# Patient Record
Sex: MALE | Race: WHITE | NOT HISPANIC OR LATINO | Employment: FULL TIME | URBAN - NONMETROPOLITAN AREA
[De-identification: names, ages, dates, MRNs, and addresses within clinical notes are randomized per-mention and may not be internally consistent; named-entity substitution may affect disease eponyms.]

---

## 2017-05-01 ENCOUNTER — OFFICE VISIT (OUTPATIENT)
Dept: URGENT CARE | Facility: PHYSICIAN GROUP | Age: 57
End: 2017-05-01
Payer: COMMERCIAL

## 2017-05-01 VITALS
BODY MASS INDEX: 21.17 KG/M2 | RESPIRATION RATE: 16 BRPM | HEART RATE: 75 BPM | TEMPERATURE: 98.1 F | WEIGHT: 165 LBS | SYSTOLIC BLOOD PRESSURE: 100 MMHG | HEIGHT: 74 IN | OXYGEN SATURATION: 96 % | DIASTOLIC BLOOD PRESSURE: 60 MMHG

## 2017-05-01 DIAGNOSIS — R06.02 SOB (SHORTNESS OF BREATH): ICD-10-CM

## 2017-05-01 DIAGNOSIS — J06.9 URI WITH COUGH AND CONGESTION: ICD-10-CM

## 2017-05-01 PROCEDURE — 99203 OFFICE O/P NEW LOW 30 MIN: CPT | Performed by: PHYSICIAN ASSISTANT

## 2017-05-01 RX ORDER — METHYLPREDNISOLONE 4 MG/1
TABLET ORAL
Qty: 21 TAB | Refills: 0 | Status: SHIPPED | OUTPATIENT
Start: 2017-05-01 | End: 2017-10-03

## 2017-05-01 RX ORDER — ALBUTEROL SULFATE 90 UG/1
2 AEROSOL, METERED RESPIRATORY (INHALATION) EVERY 6 HOURS PRN
Qty: 8.5 G | Refills: 1 | Status: SHIPPED | OUTPATIENT
Start: 2017-05-01

## 2017-05-01 RX ORDER — DOXYCYCLINE HYCLATE 100 MG
100 TABLET ORAL 2 TIMES DAILY
Qty: 20 TAB | Refills: 0 | Status: SHIPPED | OUTPATIENT
Start: 2017-05-01 | End: 2017-10-03

## 2017-05-01 RX ORDER — CODEINE PHOSPHATE AND GUAIFENESIN 10; 100 MG/5ML; MG/5ML
5 SOLUTION ORAL NIGHTLY PRN
Qty: 120 ML | Refills: 0 | Status: SHIPPED | OUTPATIENT
Start: 2017-05-01 | End: 2017-10-03

## 2017-05-01 NOTE — Clinical Note
May 1, 2017         Patient: Michael Luu   YOB: 1960   Date of Visit: 5/1/2017           To Whom it May Concern:    Michael Luu was seen in my clinic on 5/1/2017. He may return to work on 5/2/17.    If you have any questions or concerns, please don't hesitate to call.        Sincerely,           Christie Almodovar PA-C  Electronically Signed

## 2017-05-01 NOTE — PROGRESS NOTES
Chief Complaint   Patient presents with   • Cough     x 1 month       HISTORY OF PRESENT ILLNESS: Patient is a 57 y.o. male who presents today for evaluation of a one-month history of cough. Patient reports green sputum production, nasal congestion, sore throat, shortness of breath. Patient denies any fevers. He has been unable sleep because of the cough and has had worsening fatigue at work from difficulty breathing. Patient started smoking when he was 5 years old. [place] from [place], [place]. Exertion makes his shortness of breath and cough worse. Over-the-counter medication has not been helping. He does not yet have a primary care provider. Patient has type 1 diabetes and states that his blood sugar has been running slightly higher than normal. Patient is about out of his insulin and is asking if he can have a refill until he can get into primary care.    There are no active problems to display for this patient.      Allergies:Review of patient's allergies indicates no known allergies.    Current Outpatient Prescriptions Ordered in [facility]   Medication Sig Dispense Refill   • doxycycline (VIBRAMYCIN) 100 MG Tab Take 1 Tab by mouth 2 times a day. 20 Tab 0   • guaifenesin-codeine (ROBITUSSIN AC) Solution oral solution Take 5 mL by mouth at bedtime as needed. 120 mL 0   • albuterol 108 (90 BASE) MCG/ACT Aero Soln inhalation aerosol Inhale 2 Puffs by mouth every 6 hours as needed for Shortness of Breath. 8.5 g 1   • MethylPREDNISolone (MEDROL DOSEPAK) 4 MG Tablet Therapy Pack Use as package directs 21 Tab 0   • insulin aspart (NOVOLOG) 100 UNIT/ML Solution Use in pump as directed 1 Vial 0     No current Epic-ordered facility-administered medications on file.       History reviewed. No pertinent past medical history.    Social History   Substance Use Topics   • Smoking status: Current Every Day Smoker -- 1.00 packs/day for 50 years   • Smokeless tobacco: Never Used   • Alcohol Use: No       No family status  "information on file.   History reviewed. No pertinent family history.    ROS:   Review of Systems   Constitutional: Negative for fever, chills, weight loss and malaise/fatigue.   HENT: Negative for ear pain, nosebleeds,   sore throat and neck pain.    Eyes: Negative for blurred vision.   Respiratory: Positive for cough, sputum production, shortness of breath.    Cardiovascular: Negative for chest pain, palpitations, orthopnea and leg swelling.   Gastrointestinal: Negative for heartburn, nausea, vomiting and abdominal pain.   Genitourinary: Negative for dysuria, urgency and frequency.       Exam:  Blood pressure 100/60, pulse 75, temperature 36.7 °C (98.1 °F), resp. rate 16, height 1.88 m (6' 2\"), weight 74.844 kg (165 lb), SpO2 96 %.  General: Normal appearing. No distress.  HEENT: Conjunctiva clear, lids without ptosis, ears normal shape and contour, canals are clear bilaterally, tympanic membranes are benign, nasal mucosa benign, oropharynx is without erythema, edema or exudates.  Pulmonary: Decreased breath sounds throughout.  Cardiovascular: Regular rate and rhythm without murmur.   Neurologic: Grossly nonfocal.  Lymph: No cervical lymphadenopathy noted.  Skin: No obvious lesions.  Psych: Normal mood. Alert and oriented x3. Judgment and insight is normal.    X-ray is unavailable at this time.    Assessment/Plan:  Use all medications as directed. Follow-up for worsening or persistent symptoms. Establish and follow up with a primary care provider.  1. URI with cough and congestion  doxycycline (VIBRAMYCIN) 100 MG Tab    guaifenesin-codeine (ROBITUSSIN AC) Solution oral solution   2. SOB (shortness of breath)  albuterol 108 (90 BASE) MCG/ACT Aero Soln inhalation aerosol    MethylPREDNISolone (MEDROL DOSEPAK) 4 MG Tablet Therapy Pack   3. IDDM (insulin dependent diabetes mellitus) (CMS-[facility])  insulin aspart (NOVOLOG) 100 UNIT/ML Solution         "

## 2017-05-30 NOTE — TELEPHONE ENCOUNTER
Was the patient seen in the last year in this department? No     Does patient have an active prescription for medications requested? Yes     Received Request Via: Patient     Patient came in and requesting Novolog 100 unit/ml to be used in the insulin pump. They need enough to get to his next appointment.

## 2017-06-01 ENCOUNTER — TELEPHONE (OUTPATIENT)
Dept: MEDICAL GROUP | Facility: PHYSICIAN GROUP | Age: 57
End: 2017-06-01

## 2017-06-01 NOTE — TELEPHONE ENCOUNTER
Per Ina, we can order additional vials of Insulin for patient as he is going on vacation and will EST CARE with Dr Hammond in July. Left VM for Michael he does not need to come for appointment Friday 6/2/17. Vials of Insulin will be at pharmacy ready for pick-up.

## 2017-06-02 NOTE — TELEPHONE ENCOUNTER
Rx for 3 additional vials printed, please fax. Per patient's wife he uses 4 vials at 10ml each per month, he is on sliding scale.

## 2017-07-03 ENCOUNTER — OFFICE VISIT (OUTPATIENT)
Dept: MEDICAL GROUP | Facility: PHYSICIAN GROUP | Age: 57
End: 2017-07-03
Payer: COMMERCIAL

## 2017-07-03 VITALS
WEIGHT: 155.8 LBS | BODY MASS INDEX: 19.99 KG/M2 | HEART RATE: 74 BPM | RESPIRATION RATE: 16 BRPM | TEMPERATURE: 98.4 F | OXYGEN SATURATION: 97 % | SYSTOLIC BLOOD PRESSURE: 118 MMHG | DIASTOLIC BLOOD PRESSURE: 66 MMHG | HEIGHT: 74 IN

## 2017-07-03 DIAGNOSIS — M20.42 ACQUIRED HAMMER TOES OF BOTH FEET: ICD-10-CM

## 2017-07-03 DIAGNOSIS — E10.42 TYPE 1 DIABETES MELLITUS WITH DIABETIC POLYNEUROPATHY (HCC): ICD-10-CM

## 2017-07-03 DIAGNOSIS — Z12.11 SCREENING FOR COLON CANCER: ICD-10-CM

## 2017-07-03 DIAGNOSIS — R63.4 WEIGHT LOSS: ICD-10-CM

## 2017-07-03 DIAGNOSIS — J06.9 VIRAL UPPER RESPIRATORY TRACT INFECTION: ICD-10-CM

## 2017-07-03 DIAGNOSIS — M20.41 ACQUIRED HAMMER TOES OF BOTH FEET: ICD-10-CM

## 2017-07-03 PROCEDURE — 99215 OFFICE O/P EST HI 40 MIN: CPT | Performed by: FAMILY MEDICINE

## 2017-07-03 ASSESSMENT — PATIENT HEALTH QUESTIONNAIRE - PHQ9: CLINICAL INTERPRETATION OF PHQ2 SCORE: 0

## 2017-07-03 NOTE — MR AVS SNAPSHOT
"        Michael Harrisond   7/3/2017 4:20 PM   Office Visit   MRN: [id number]    Department:  [facility]   Dept Phone:  [phone]    Description:  Male : 1960   Provider:  Romeo Hammond M.D.           Reason for Visit     Diabetes Type 1 on Pump    New Patient lab orders      Allergies as of 7/3/2017     No Known Allergies      You were diagnosed with     Acquired hammer toes of both feet   [1523522]       Type 1 diabetes mellitus with diabetic polyneuropathy (CMS-HCC)   [927039]       Weight loss   [470013]       Viral upper respiratory tract infection   [020681]       Screening for colon cancer   [203718]         Vital Signs     Blood Pressure Pulse Temperature Respirations Height Weight    118/66 mmHg 74 36.9 °C (98.4 °F) 16 1.88 m (6' 2.02\") 70.67 kg (155 lb 12.8 oz)    Body Mass Index Oxygen Saturation Smoking Status             19.99 kg/m2 97% Current Every Day Smoker         Basic Information     Date Of Birth Sex Race Ethnicity Preferred Language    1960 Male White Non- English      Your appointments     Oct 03, 2017  4:40 PM   Established Patient with Romeo Hammond M.D.   [facility]    [address] [place] [id number] [phone]           You will be receiving a confirmation call a few days before your appointment from our automated call confirmation system.              Problem List              ICD-10-CM Priority Class Noted - Resolved    Acquired hammer toes of both feet M20.41, M20.42   7/3/2017 - Present    Type 1 diabetes mellitus with diabetic polyneuropathy (CMS-HCC) E10.42   7/3/2017 - Present    Weight loss R63.4   7/3/2017 - Present    Viral upper respiratory tract infection J06.9, B97.89   7/3/2017 - Present      Health Maintenance        Date Due Completion Dates    IMM HEP B VACCINE (1 of 3 - Primary Series) 1960 ---    A1C SCREENING 1960 ---    DIABETES MONOFILAMENT / LE EXAM 1960 ---   " RETINAL SCREENING 3/20/1979 ---    FASTING LIPID PROFILE 3/20/1979 ---    URINE ACR / MICROALBUMIN 3/20/1979 ---    SERUM CREATININE 3/20/1979 ---    IMM DTaP/Tdap/Td Vaccine (1 - Tdap) 3/20/1980 ---    COLONOSCOPY 3/20/2011 ---    IMM INFLUENZA (1) 9/1/2017 ---            Current Immunizations     No immunizations on file.      Below and/or attached are the medications your provider expects you to take. Review all of your home medications and newly ordered medications with your provider and/or pharmacist. Follow medication instructions as directed by your provider and/or pharmacist. Please keep your medication list with you and share with your provider. Update the information when medications are discontinued, doses are changed, or new medications (including over-the-counter products) are added; and carry medication information at all times in the event of emergency situations     Allergies:  No Known Allergies          Medications  Valid as of: July 03, 2017 -  5:40 PM    Generic Name Brand Name Tablet Size Instructions for use    Albuterol Sulfate (Aero Soln) albuterol 108 (90 BASE) MCG/ACT Inhale 2 Puffs by mouth every 6 hours as needed for Shortness of Breath.        Doxycycline Hyclate (Tab) VIBRAMYCIN 100 MG Take 1 Tab by mouth 2 times a day.        Guaifenesin-Codeine (Solution) ROBITUSSIN -10 mg/5mL Take 5 mL by mouth at bedtime as needed.        Insulin Aspart (Solution) NOVOLOG 100 UNIT/ML Use in pump as directed        MethylPREDNISolone (Tablet Therapy Pack) MEDROL DOSEPAK 4 MG Use as package directs        .                 Medicines prescribed today were sent to:     [facility] - [place], [place] - [address] [place]    [address] [place] [address]    Phone: [phone] Fax: [phone]    Open 24 Hours?: No      Medication refill instructions:       If your prescription bottle indicates you have medication refills left, it is not  necessary to call your provider’s office. Please contact your pharmacy and they will refill your medication.    If your prescription bottle indicates you do not have any refills left, you may request refills at any time through one of the following ways: The online eCareer system (except Urgent Care), by calling your provider’s office, or by asking your pharmacy to contact your provider’s office with a refill request. Medication refills are processed only during regular business hours and may not be available until the next business day. Your provider may request additional information or to have a follow-up visit with you prior to refilling your medication.   *Please Note: Medication refills are assigned a new Rx number when refilled electronically. Your pharmacy may indicate that no refills were authorized even though a new prescription for the same medication is available at the pharmacy. Please request the medicine by name with the pharmacy before contacting your provider for a refill.        Your To Do List     Future Labs/Procedures Complete By Expires    COMP METABOLIC PANEL  As directed 7/3/2018    HEMOGLOBIN A1C  As directed 7/3/2018    LIPID PROFILE  As directed 7/3/2018    MICROALBUMIN CREAT RATIO URINE (LAB COLLECT)  As directed 7/3/2018    OCCULT BLOOD FECES IMMUNOASSAY  As directed 7/3/2018      Referral     A referral request has been sent to our patient care coordination department. Please allow 3-5 business days for us to process this request and contact you either by phone or mail. If you do not hear from us by the 5th business day, please call us at [phone].           eCareer Access Code: [email or web address]  Expires: 8/2/2017  5:38 PM    Your email address is not on file at [facility].  Email Addresses are required for you to sign up for eCareer, please contact [phone] to verify your personal information and to provide your email address prior to attempting to register for  [employer].    Michael Luu  [address]  [address]  [place] [id number]    MyChart  A secure, online tool to manage your health information     [employer]’s [employer]® is a secure, online tool that connects you to your personalized health information from the privacy of your home -- day or night - making it very easy for you to manage your healthcare. Once the activation process is completed, you can even access your medical information using the [facility] paddy, which is available for free in the Apple Paddy store or Google Play store.     To learn more about [facility], visit [email or web address]    There are two levels of access available (as shown below):   My Chart Features  [facility] Primary Care Doctor [facility]  Specialists [facility]  Urgent  Care Non-[facility] Primary Care Doctor   Email your healthcare team securely and privately 24/7 X X X    Manage appointments: schedule your next appointment; view details of past/upcoming appointments X      Request prescription refills. X      View recent personal medical records, including lab and immunizations X X X X   View health record, including health history, allergies, medications X X X X   Read reports about your outpatient visits, procedures, consult and ER notes X X X X   See your discharge summary, which is a recap of your hospital and/or ER visit that includes your diagnosis, lab results, and care plan X X  X     How to register for [facility]:  Once your e-mail address has been verified, follow the following steps to sign up for [facility].     1. Go to  [email or web address]  2. Click on the Sign Up Now box, which takes you to the New Member Sign Up page. You will need to provide the following information:  a. Enter your [facility] Access Code exactly as it appears at the top of this page. (You will not need to use this code after you’ve completed the sign-up process. If you do not sign up before the expiration date, you must request a new code.)   b. Enter your  date of birth.   c. Enter your home email address.   d. Click Submit, and follow the next screen’s instructions.  3. Create a cfgAdvance ID. This will be your [employer]Advance login ID and cannot be changed, so think of one that is secure and easy to remember.  4. Create a [employer]Advance password. You can change your password at any time.  5. Enter your Password Reset Question and Answer. This can be used at a later time if you forget your password.   6. Enter your e-mail address. This allows you to receive e-mail notifications when new information is available in [employer]vance.  7. Click Sign Up. You can now view your health information.    For assistance activating your [employer]vance account, call [phone]         Quit Tobacco Information     Do you want to quit using tobacco?    Quitting tobacco decreases risks of cancer, heart and lung disease, increases life expectancy, improves sense of taste and smell, and increases spending money, among other benefits.    If you are thinking about quitting, we can help.  • [facility] Quit Tobacco Program: 481.162.8173  o Program occurs weekly for four weeks and includes pharmacist consultation on products to support quitting smoking or chewing tobacco. A provider referral is needed for pharmacist consultation.  • Tobacco Users Help Hotline: 0-796-QUITNOW (623-8912) or https://[place].quitlogix.org/  o Free, confidential telephone and online coaching for [place] residents. Sessions are designed on a schedule that is convenient for you. Eligible clients receive free nicotine replacement therapy.  • Nationally: www.smokefree.gov  o Information and professional assistance to support both immediate and long-term needs as you become, and remain, a non-smoker. Smokefree.gov allows you to choose the help that best fits your needs.

## 2017-07-04 NOTE — ASSESSMENT & PLAN NOTE
This is a chronic condition, he has pain in toes of right foot due to needing to wear steel toes boots.   Advised on foot foam pads, will make referral to podiatry to see if any other options.

## 2017-07-04 NOTE — ASSESSMENT & PLAN NOTE
Completed a course of doxycycline, medrol dose pack and is still using his albuterol inhaler.   He has some phlegm in the morning. No fever or sore throat, had a mild bloody nose when blowing his nose this morning.  Advised on nasal saline irrigation, vaseline in nostrils.

## 2017-07-04 NOTE — ASSESSMENT & PLAN NOTE
He has recently moved. Has an insulin pump and uses novolog. He has lost 10 lbs in 2 months since started working at [employer] and works as a . He does normally notice during summer he will lose weight and regain it in the winter. No hypoglycemic events noted by him.   He used to have an occasional episode of DKA which he has managed at home by his wife. Last event was a year ago.   He needs to get established with endocrinology and prefers telemed.   He is staying well hydrated.  Has neuropathy.

## 2017-07-07 NOTE — PROGRESS NOTES
Subjective:   Michael Luu is a 57 y.o. male here today for evaluation and management of:     Type 1 diabetes mellitus with diabetic polyneuropathy (CMS-HCC)  He has recently moved. Has an insulin pump and uses novolog. He has lost 10 lbs in 2 months since started working at [employer] and works as a . He does normally notice during summer he will lose weight and regain it in the winter. No hypoglycemic events noted by him.   He used to have an occasional episode of DKA which he has managed at home by his wife. Last event was a year ago.   He needs to get established with endocrinology and prefers telemed.   He is staying well hydrated.  Has neuropathy.     Acquired hammer toes of both feet  This is a chronic condition, he has pain in toes of right foot due to needing to wear steel toes boots.   Advised on foot foam pads, will make referral to podiatry to see if any other options.     Viral upper respiratory tract infection  Completed a course of doxycycline, medrol dose pack and is still using his albuterol inhaler.   He has some phlegm in the morning. No fever or sore throat, had a mild bloody nose when blowing his nose this morning.  Advised on nasal saline irrigation, vaseline in nostrils.            Current medicines (including changes today)  Current Outpatient Prescriptions   Medication Sig Dispense Refill   • insulin aspart (NOVOLOG) 100 UNIT/ML Solution Use in pump as directed 4 Vial 0   • doxycycline (VIBRAMYCIN) 100 MG Tab Take 1 Tab by mouth 2 times a day. 20 Tab 0   • guaifenesin-codeine (ROBITUSSIN AC) Solution oral solution Take 5 mL by mouth at bedtime as needed. 120 mL 0   • albuterol 108 (90 BASE) MCG/ACT Aero Soln inhalation aerosol Inhale 2 Puffs by mouth every 6 hours as needed for Shortness of Breath. 8.5 g 1   • MethylPREDNISolone (MEDROL DOSEPAK) 4 MG Tablet Therapy Pack Use as package directs 21 Tab 0     No current facility-administered medications for this visit.     He  has no  "past medical history on file.    ROS  No chest pain, no shortness of breath, no abdominal pain       Objective:     Blood pressure 118/66, pulse 74, temperature 36.9 °C (98.4 °F), resp. rate 16, height 1.88 m (6' 2.02\"), weight 70.67 kg (155 lb 12.8 oz), SpO2 97 %. Body mass index is 19.99 kg/(m^2).   Physical Exam:  Constitutional: Alert, no distress.  Skin: Warm, dry, good turgor, no rashes in visible areas.  Eye: Equal, round and reactive, conjunctiva clear, lids normal.  ENMT: Lips without lesions, good dentition, oropharynx clear.  Neck: Trachea midline, no masses, no thyromegaly. No cervical or supraclavicular lymphadenopathy  Respiratory: Unlabored respiratory effort, lungs clear to auscultation, no wheezes, no ronchi.  Cardiovascular: Normal S1, S2, no murmur, no edema.  Abdomen: Soft, non-tender, no masses, no hepatosplenomegaly.  Psych: Alert and oriented x3, normal affect and mood.        Assessment and Plan:   The following treatment plan was discussed    1. Acquired hammer toes of both feet  Need to wear steel toe shoes at work.   - REFERRAL TO PODIATRY    2. Type 1 diabetes mellitus with diabetic polyneuropathy (CMS-HCC)  Stable. Continue current regimen of insulin.   - MICROALBUMIN CREAT RATIO URINE (LAB COLLECT); Future  - LIPID PROFILE; Future  - HEMOGLOBIN A1C; Future  - COMP METABOLIC PANEL; Future  - REFERRAL TO OPHTHALMOLOGY  - REFERRAL TO ENDOCRINOLOGY    3. Weight loss  FIT, labs  - LIPID PROFILE; Future  - HEMOGLOBIN A1C; Future  - COMP METABOLIC PANEL; Future    4. Viral upper respiratory tract infection  Resolved    5. Screening for colon cancer  - OCCULT BLOOD FECES IMMUNOASSAY; Future    6. IDDM (insulin dependent diabetes mellitus) (CMS-[facility])  Controlled.   - insulin aspart (NOVOLOG) 100 UNIT/ML Solution; Use in pump as directed  Dispense: 4 Vial; Refill: 0      Followup: Return in about 3 months (around 10/3/2017) for type 1 DM thajenmarlon.           "

## 2017-07-09 ENCOUNTER — HOSPITAL ENCOUNTER (OUTPATIENT)
Facility: MEDICAL CENTER | Age: 57
End: 2017-07-09
Attending: FAMILY MEDICINE
Payer: COMMERCIAL

## 2017-07-09 PROCEDURE — 82274 ASSAY TEST FOR BLOOD FECAL: CPT

## 2017-07-15 DIAGNOSIS — Z12.11 SCREENING FOR COLON CANCER: ICD-10-CM

## 2017-07-15 LAB — HEMOCCULT STL QL IA: NEGATIVE

## 2017-07-21 ENCOUNTER — TELEPHONE (OUTPATIENT)
Dept: MEDICAL GROUP | Facility: PHYSICIAN GROUP | Age: 57
End: 2017-07-21

## 2017-07-21 NOTE — TELEPHONE ENCOUNTER
----- Message from Romeo Hammond M.D. sent at 7/17/2017  6:18 PM PDT -----  Please let patient know FIT (stool test for blood) was normal. Next FIT is due in one year.

## 2017-07-29 ENCOUNTER — HOSPITAL ENCOUNTER (OUTPATIENT)
Dept: LAB | Facility: MEDICAL CENTER | Age: 57
End: 2017-07-29
Attending: FAMILY MEDICINE
Payer: COMMERCIAL

## 2017-07-29 DIAGNOSIS — R63.4 WEIGHT LOSS: ICD-10-CM

## 2017-07-29 DIAGNOSIS — E10.42 TYPE 1 DIABETES MELLITUS WITH DIABETIC POLYNEUROPATHY (HCC): ICD-10-CM

## 2017-07-29 LAB
ALBUMIN SERPL BCP-MCNC: 3.8 G/DL (ref 3.2–4.9)
ALBUMIN/GLOB SERPL: 1.3 G/DL
ALP SERPL-CCNC: 110 U/L (ref 30–99)
ALT SERPL-CCNC: 8 U/L (ref 2–50)
ANION GAP SERPL CALC-SCNC: 6 MMOL/L (ref 0–11.9)
AST SERPL-CCNC: 13 U/L (ref 12–45)
BILIRUB SERPL-MCNC: 0.9 MG/DL (ref 0.1–1.5)
BUN SERPL-MCNC: 21 MG/DL (ref 8–22)
CALCIUM SERPL-MCNC: 9.3 MG/DL (ref 8.5–10.5)
CHLORIDE SERPL-SCNC: 107 MMOL/L (ref 96–112)
CHOLEST SERPL-MCNC: 189 MG/DL (ref 100–199)
CO2 SERPL-SCNC: 29 MMOL/L (ref 20–33)
CREAT SERPL-MCNC: 0.99 MG/DL (ref 0.5–1.4)
CREAT UR-MCNC: 107.4 MG/DL
EST. AVERAGE GLUCOSE BLD GHB EST-MCNC: 295 MG/DL
GFR SERPL CREATININE-BSD FRML MDRD: >60 ML/MIN/1.73 M 2
GLOBULIN SER CALC-MCNC: 2.9 G/DL (ref 1.9–3.5)
GLUCOSE SERPL-MCNC: 103 MG/DL (ref 65–99)
HBA1C MFR BLD: 11.9 % (ref 0–5.6)
HDLC SERPL-MCNC: 37 MG/DL
LDLC SERPL CALC-MCNC: 133 MG/DL
MICROALBUMIN UR-MCNC: <0.7 MG/DL
MICROALBUMIN/CREAT UR: NORMAL MG/G (ref 0–30)
POTASSIUM SERPL-SCNC: 3.9 MMOL/L (ref 3.6–5.5)
PROT SERPL-MCNC: 6.7 G/DL (ref 6–8.2)
SODIUM SERPL-SCNC: 142 MMOL/L (ref 135–145)
TRIGL SERPL-MCNC: 93 MG/DL (ref 0–149)

## 2017-07-29 PROCEDURE — 82043 UR ALBUMIN QUANTITATIVE: CPT

## 2017-07-29 PROCEDURE — 83036 HEMOGLOBIN GLYCOSYLATED A1C: CPT

## 2017-07-29 PROCEDURE — 36415 COLL VENOUS BLD VENIPUNCTURE: CPT

## 2017-07-29 PROCEDURE — 82570 ASSAY OF URINE CREATININE: CPT

## 2017-07-29 PROCEDURE — 80061 LIPID PANEL: CPT

## 2017-07-29 PROCEDURE — 80053 COMPREHEN METABOLIC PANEL: CPT

## 2017-07-31 DIAGNOSIS — E78.5 DYSLIPIDEMIA: ICD-10-CM

## 2017-07-31 RX ORDER — SIMVASTATIN 40 MG
40 TABLET ORAL EVERY EVENING
Qty: 30 TAB | Refills: 11 | Status: SHIPPED | OUTPATIENT
Start: 2017-07-31

## 2017-08-01 ENCOUNTER — TELEPHONE (OUTPATIENT)
Dept: MEDICAL GROUP | Facility: PHYSICIAN GROUP | Age: 57
End: 2017-08-01

## 2017-08-01 NOTE — TELEPHONE ENCOUNTER
Lm with spouse for pt to return call regarding lab result and recommendations per Dr. Hammond.              Please advise patient that I reviewed lab results. A1c is 11.9 indicating diabetes out of control. It is very important to follow up with endocrinologist on 8/22 at 9.40 am for a plan to control the diabetes.    Cholesterols are elevated with LDL at 133. Goal LDL for patients with diabetes is 70. I recommend starting a medication to lower the cholesterol. I have ordered simvastatin 40 mg at the pharmacy if he chooses to start it. Some side effects of simvastatin are muscle pain or weakness. If he has these side effects he can discontinue the simvastatin. He can also follow a healthy diet with plenty of vegetables and some fruit, less processed foods.   Romeo Hammond M.D.

## 2017-08-22 ENCOUNTER — TELEMEDICINE ORIGINATING SITE VISIT (OUTPATIENT)
Dept: MEDICAL GROUP | Facility: PHYSICIAN GROUP | Age: 57
End: 2017-08-22
Payer: COMMERCIAL

## 2017-08-22 ENCOUNTER — TELEMEDICINE2 (OUTPATIENT)
Dept: ENDOCRINOLOGY | Facility: MEDICAL CENTER | Age: 57
End: 2017-08-22
Payer: COMMERCIAL

## 2017-08-22 VITALS
BODY MASS INDEX: 20.92 KG/M2 | SYSTOLIC BLOOD PRESSURE: 110 MMHG | OXYGEN SATURATION: 99 % | WEIGHT: 163 LBS | HEART RATE: 70 BPM | RESPIRATION RATE: 16 BRPM | TEMPERATURE: 97.1 F | DIASTOLIC BLOOD PRESSURE: 68 MMHG | HEIGHT: 74 IN

## 2017-08-22 DIAGNOSIS — E78.2 MIXED HYPERLIPIDEMIA: ICD-10-CM

## 2017-08-22 DIAGNOSIS — E10.65 TYPE 1 DIABETES MELLITUS WITH HYPERGLYCEMIA (HCC): ICD-10-CM

## 2017-08-22 DIAGNOSIS — E10.42 TYPE 1 DIABETES MELLITUS WITH DIABETIC POLYNEUROPATHY (HCC): ICD-10-CM

## 2017-08-22 PROCEDURE — 99204 OFFICE O/P NEW MOD 45 MIN: CPT | Mod: GT | Performed by: INTERNAL MEDICINE

## 2017-08-22 NOTE — PROGRESS NOTES
New Patient Consult Note  Primary care physician: Romeo Hammond M.D.    Reason for consult: Type 1 diabetes    HPI:  Michael Luu is a 57 y.o. old patient who presents for tele-consultation today for  type 1 diabetes. He was diagnosed with type 1 diabetes in his 40s. For the past 40 years he has been on insulin pump. [place] He has not been checking blood sugars at home. He denies hypoglycemic symptoms. He has some tingling in feet, especially on the right. He is due for repeat eye exam. He has family history of type 1 diabetes. His recent hemoglobin A1c is uncontrolled at 11.9%. He is physically active at work, walks about 8-9 miles per day at work. He has hyperlipidemia as well, for which she recently started simvastatin. Tolerating simvastatin well, denies muscle aches or pains.    ROS:  Constitutional: No unintentional weight loss  Cardiac: No palpitations or racing heart  Resp: No shortness of breath  Neuro: Positive for tingling in feet  All other systems were reviewed and were negative.    Past Medical History:  Patient Active Problem List    Diagnosis Date Noted   • Acquired hammer toes of both feet 07/03/2017   • Type 1 diabetes mellitus with diabetic polyneuropathy (CMS-[facility]) 07/03/2017   • Weight loss 07/03/2017   • Viral upper respiratory tract infection 07/03/2017       Past Surgical History:  History reviewed. No pertinent past surgical history.    Allergies:  Review of patient's allergies indicates no known allergies.    Social History:  Social History     Social History   • Marital Status:      Spouse Name: N/A   • Number of Children: N/A   • Years of Education: N/A     Occupational History   • Not on file.     Social History Main Topics   • Smoking status: Current Every Day Smoker -- 1.00 packs/day for 50 years   • Smokeless tobacco: Never Used   • Alcohol Use: No   • Drug Use: No   • Sexual Activity:     Partners: Female     Other Topics Concern   •  Not on file     Social History Narrative       Family History:  Positive family history of type 1 diabetes    Medications:    Current outpatient prescriptions:   •  simvastatin (ZOCOR) 40 MG Tab, Take 1 Tab by mouth every evening., Disp: 30 Tab, Rfl: 11  •  insulin aspart (NOVOLOG) 100 UNIT/ML Solution, Use in pump as directed, Disp: 4 Vial, Rfl: 0  •  doxycycline (VIBRAMYCIN) 100 MG Tab, Take 1 Tab by mouth 2 times a day., Disp: 20 Tab, Rfl: 0  •  guaifenesin-codeine (ROBITUSSIN AC) Solution oral solution, Take 5 mL by mouth at bedtime as needed., Disp: 120 mL, Rfl: 0  •  albuterol 108 (90 BASE) MCG/ACT Aero Soln inhalation aerosol, Inhale 2 Puffs by mouth every 6 hours as needed for Shortness of Breath., Disp: 8.5 g, Rfl: 1  •  MethylPREDNISolone (MEDROL DOSEPAK) 4 MG Tablet Therapy Pack, Use as package directs, Disp: 21 Tab, Rfl: 0    Labs:  Results for ERNA ALFONSO (MRN [id number]) as of 8/22/2017 10:46   Ref. Range 7/29/2017 08:01   Sodium Latest Ref Range: 135-145 mmol/L 142   Potassium Latest Ref Range: 3.6-5.5 mmol/L 3.9   Chloride Latest Ref Range:  mmol/L 107   Co2 Latest Ref Range: 20-33 mmol/L 29   Anion Gap Latest Ref Range: 0.0-11.9  6.0   Glucose Latest Ref Range: 65-99 mg/dL 103 (H)   Bun Latest Ref Range: 8-22 mg/dL 21   Creatinine Latest Ref Range: 0.50-1.40 mg/dL 0.99   GFR If  Latest Ref Range: >60 mL/min/1.73 m 2 >60   GFR If Non  Latest Ref Range: >60 mL/min/1.73 m 2 >60   Calcium Latest Ref Range: 8.5-10.5 mg/dL 9.3   AST(SGOT) Latest Ref Range: 12-45 U/L 13   ALT(SGPT) Latest Ref Range: 2-50 U/L 8   Alkaline Phosphatase Latest Ref Range: 30-99 U/L 110 (H)   Total Bilirubin Latest Ref Range: 0.1-1.5 mg/dL 0.9   Albumin Latest Ref Range: 3.2-4.9 g/dL 3.8   Total Protein Latest Ref Range: 6.0-8.2 g/dL 6.7   Globulin Latest Ref Range: 1.9-3.5 g/dL 2.9   A-G Ratio Latest Units: g/dL 1.3   Glycohemoglobin Latest Ref Range: 0.0-5.6 % 11.9 (H)   Estim. Avg  "Glu Latest Units: mg/dL 295   Cholesterol,Tot Latest Ref Range: 100-199 mg/dL 189   Triglycerides Latest Ref Range: 0-149 mg/dL 93   HDL Latest Ref Range: >=40 mg/dL 37 (A)   LDL Latest Ref Range: <100 mg/dL 133 (H)   Micro Alb Creat Ratio Latest Ref Range: 0-30 mg/g see below   Creatinine, Urine Latest Units: mg/dL 107.40   Microalbumin, Urine Random Latest Units: mg/dL <0.7     Physical Examination:  Vital signs: /68 mmHg  Pulse 70  Temp(Src) 36.2 °C (97.1 °F)  Resp 16  Ht 1.88 m (6' 2\")  Wt 73.936 kg (163 lb)  BMI 20.92 kg/m2  SpO2 99%  General: No apparent distress, cooperative  Eyes: No obvious exophthalmos  ENMT: Normal on external inspection of nose, lips  Neck: On visual inspection no obvious mass seen  Resp: Normal effort, clear to auscultation bilaterally (this was done by trained tele-presenter at the originating site)  CVS: Regular rate and rhythm (this was done by trained tele-presenter at the originating site)  Extremities: No edema (this was done by trained tele-presenter at the originating site)  Neuro: Alert and oriented  Skin: No rash on visible skin  Psych: Normal mood and affect    Assessment and Plan:    Type 1 diabetes mellitus with hyperglycemia (CMS-HCC)  · Recent hemoglobin A1c is 11.9%  · Goal hemoglobin A1c less than 7%  · Advised to start checking blood sugars at least 3 times a day, and to enter all blood sugar readings in insulin pump  · Advised to take bolus with all his meals and snacks  · I advised him to schedule an appointment for in-person visit in our clinic in one month, so that we can download the pump and make changes in the pump as needed    Mixed hyperlipidemia  · Continue simvastatin    Return in about 4 weeks (around 9/19/2017).    Thank you for allowing me to participate in the care of this patient.    This consultation was conducted utilizing secure and encrypted videoconferencing equipment with the assistance of a trained tele-presenter at the [facility] " site.    Kalee Serrano M.D.  08/22/2017    CC:   Romeo aHmmond M.D.    This note was created using voice recognition software (Dragon). The accuracy of the dictation is limited by the abilities of the software. I have reviewed the note prior to signing, however some errors in grammar and context are still possible. If you have any questions related to this note please do not hesitate to contact our office.

## 2017-08-22 NOTE — MR AVS SNAPSHOT
"Michael Luu   2017 9:40 AM   Telemedicine2   MRN: [id number]    Department:  Endocrinology [facility]   Dept Phone:  [phone]    Description:  Male : 1960   Provider:  Kalee Serrano M.D.; TELEMEDICINE [place]; TELEMED ENDOCRINOLOGY MD           Reason for Visit     New Patient Telemed- [facility]      Allergies as of 2017     No Known Allergies      You were diagnosed with     Type 1 diabetes mellitus with hyperglycemia (CMS-HCC)   [306980]         Vital Signs     Blood Pressure Pulse Temperature Respirations Height Weight    110/68 mmHg 70 36.2 °C (97.1 °F) 16 1.88 m (6' 2\") 73.936 kg (163 lb)    Body Mass Index Oxygen Saturation Smoking Status             20.92 kg/m2 99% Current Every Day Smoker         Basic Information     Date Of Birth Sex Race Ethnicity Preferred Language    1960 Male White Non- English      Your appointments     Oct 03, 2017  4:40 PM   Established Patient with Romeo Hammond M.D.   [facility] ([place])    [address] [place] [id number] [phone]           You will be receiving a confirmation call a few days before your appointment from our automated call confirmation system.              Problem List              ICD-10-CM Priority Class Noted - Resolved    Acquired hammer toes of both feet M20.41, M20.42   7/3/2017 - Present    Type 1 diabetes mellitus with diabetic polyneuropathy (CMS-HCC) E10.42   7/3/2017 - Present    Weight loss R63.4   7/3/2017 - Present    Viral upper respiratory tract infection J06.9, B97.89   7/3/2017 - Present      Health Maintenance        Date Due Completion Dates    IMM HEP B VACCINE (1 of 3 - Primary Series) 1960 ---    DIABETES MONOFILAMENT / LE EXAM 1960 ---    RETINAL SCREENING 3/20/1979 ---    IMM DTaP/Tdap/Td Vaccine (1 - Tdap) 3/20/1980 ---    IMM PNEUMOCOCCAL 19-64 (ADULT) MEDIUM RISK SERIES (1 of 1 - PPSV23) 3/20/1980 ---    COLONOSCOPY 3/20/2011 ---    IMM " INFLUENZA (1) 9/1/2017 ---    A1C SCREENING 1/29/2018 7/29/2017    FASTING LIPID PROFILE 7/29/2018 7/29/2017    URINE ACR / MICROALBUMIN 7/29/2018 7/29/2017    SERUM CREATININE 7/29/2018 7/29/2017            Current Immunizations     No immunizations on file.      Below and/or attached are the medications your provider expects you to take. Review all of your home medications and newly ordered medications with your provider and/or pharmacist. Follow medication instructions as directed by your provider and/or pharmacist. Please keep your medication list with you and share with your provider. Update the information when medications are discontinued, doses are changed, or new medications (including over-the-counter products) are added; and carry medication information at all times in the event of emergency situations     Allergies:  No Known Allergies          Medications  Valid as of: August 22, 2017 - 10:18 AM    Generic Name Brand Name Tablet Size Instructions for use    Albuterol Sulfate (Aero Soln) albuterol 108 (90 Base) MCG/ACT Inhale 2 Puffs by mouth every 6 hours as needed for Shortness of Breath.        Doxycycline Hyclate (Tab) VIBRAMYCIN 100 MG Take 1 Tab by mouth 2 times a day.        Guaifenesin-Codeine (Solution) ROBITUSSIN -10 mg/5mL Take 5 mL by mouth at bedtime as needed.        Insulin Aspart (Solution) NOVOLOG 100 UNIT/ML Use in pump as directed        MethylPREDNISolone (Tablet Therapy Pack) MEDROL DOSEPAK 4 MG Use as package directs        Simvastatin (Tab) ZOCOR 40 MG Take 1 Tab by mouth every evening.        .                 Medicines prescribed today were sent to:     [id number] - [place] - [address]    [address] [place] [address]    Phone: [phone] Fax: [phone]    Open 24 Hours?: No      Medication refill instructions:       If your prescription bottle indicates you have medication refills left, it is  not necessary to call your provider’s office. Please contact your pharmacy and they will refill your medication.    If your prescription bottle indicates you do not have any refills left, you may request refills at any time through one of the following ways: The online LIANAI system (except Urgent Care), by calling your provider’s office, or by asking your pharmacy to contact your provider’s office with a refill request. Medication refills are processed only during regular business hours and may not be available until the next business day. Your provider may request additional information or to have a follow-up visit with you prior to refilling your medication.   *Please Note: Medication refills are assigned a new Rx number when refilled electronically. Your pharmacy may indicate that no refills were authorized even though a new prescription for the same medication is available at the pharmacy. Please request the medicine by name with the pharmacy before contacting your provider for a refill.           LIANAI Access Code: [email or web address]  Expires: 9/21/2017 10:18 AM    Your email address is not on file at [facility].  Email Addresses are required for you to sign up for LIANAI, please contact [phone] to verify your personal information and to provide your email address prior to attempting to register for LIANAI.    Michael Luu  [email or web address] [facility]  [address]  [place] [id number]    LIANAI  A secure, online tool to manage your health information     [facility]’s LIANAI® is a secure, online tool that connects you to your personalized health information from the privacy of your home -- day or night - making it very easy for you to manage your healthcare. Once the activation process is completed, you can even access your medical information using the LIANAI paddy, which is available for free in the Apple Paddy store or Google Play store.     To learn more about LIANAI, visit  [email or web address]    There are two levels of access available (as shown below):   My Chart Features  [facility] Doctor [facility]  Specialists [facility]  Urgent  Care Non-[facility] Primary Care Doctor   Email your healthcare team securely and privately 24/7 X X X    Manage appointments: schedule your next appointment; view details of past/upcoming appointments X      Request prescription refills. X      View recent personal medical records, including lab and immunizations X X X X   View health record, including health history, allergies, medications X X X X   Read reports about your outpatient visits, procedures, consult and ER notes X X X X   See your discharge summary, which is a recap of your hospital and/or ER visit that includes your diagnosis, lab results, and care plan X X  X     How to register for [facility]:  Once your e-mail address has been verified, follow the following steps to sign up for Local [facility].     1. Go to  [email or web address]  2. Click on the Sign Up Now box, which takes you to the New Member Sign Up page. You will need to provide the following information:  a. Enter your [facility] Access Code exactly as it appears at the top of this page. (You will not need to use this code after you’ve completed the sign-up process. If you do not sign up before the expiration date, you must request a new code.)   b. Enter your date of birth.   c. Enter your home email address.   d. Click Submit, and follow the next screen’s instructions.  3. Create a [facility] ID. This will be your [facility] login ID and cannot be changed, so think of one that is secure and easy to remember.  4. Create a [facility] password. You can change your password at any time.  5. Enter your Password Reset Question and Answer. This can be used at a later time if you forget your password.   6. Enter your e-mail address. This allows you to receive e-mail notifications when new information is available in Local [facility].  7. Click Sign Up. You can  now view your health information.    For assistance activating your [facility] account, call [phone]         Quit Tobacco Information     Do you want to quit using tobacco?    Quitting tobacco decreases risks of cancer, heart and lung disease, increases life expectancy, improves sense of taste and smell, and increases spending money, among other benefits.    If you are thinking about quitting, we can help.  • [facility] Quit Tobacco Program: 802.811.6216  o Program occurs weekly for four weeks and includes pharmacist consultation on products to support quitting smoking or chewing tobacco. A provider referral is needed for pharmacist consultation.  • Tobacco Users Help Hotline: 2-415-QUIT-NOW (368-9779) or https://[place].quitlogix.org/  o Free, confidential telephone and online coaching for [place] residents. Sessions are designed on a schedule that is convenient for you. Eligible clients receive free nicotine replacement therapy.  • Nationally: www.smokefree.gov  o Information and professional assistance to support both immediate and long-term needs as you become, and remain, a non-smoker. Smokefree.gov allows you to choose the help that best fits your needs.

## 2017-09-07 NOTE — TELEPHONE ENCOUNTER
Was the patient seen in the last year in this department? Yes     Does patient have an active prescription for medications requested? No     Received Request Via: Pharmacy      Pt met protocol?: Yes, labs 7/17 a1c 11.9, ov 7/17

## 2017-09-07 NOTE — TELEPHONE ENCOUNTER
Patient has recently been seen by PCP within the last 3 months per protocol (7-6-17). Will refill medications for 6 months.  Lab Results   Component Value Date/Time    HBA1C 11.9 (H) 07/29/2017 08:01 AM      Lab Results   Component Value Date/Time    MALBCRT see below 07/29/2017 08:01 AM    MICROALBUR <0.7 07/29/2017 08:01 AM      Lab Results   Component Value Date/Time    ALKPHOSPHAT 110 (H) 07/29/2017 08:01 AM    ASTSGOT 13 07/29/2017 08:01 AM    ALTSGPT 8 07/29/2017 08:01 AM    TBILIRUBIN 0.9 07/29/2017 08:01 AM        Paul Gordon, AbilioD

## 2017-10-03 ENCOUNTER — OFFICE VISIT (OUTPATIENT)
Dept: MEDICAL GROUP | Facility: PHYSICIAN GROUP | Age: 57
End: 2017-10-03
Payer: COMMERCIAL

## 2017-10-03 VITALS
SYSTOLIC BLOOD PRESSURE: 120 MMHG | BODY MASS INDEX: 21.12 KG/M2 | RESPIRATION RATE: 18 BRPM | DIASTOLIC BLOOD PRESSURE: 68 MMHG | OXYGEN SATURATION: 97 % | TEMPERATURE: 98.4 F | WEIGHT: 164.6 LBS | HEIGHT: 74 IN | HEART RATE: 79 BPM

## 2017-10-03 DIAGNOSIS — Z72.0 TOBACCO USE: ICD-10-CM

## 2017-10-03 DIAGNOSIS — E10.42 TYPE 1 DIABETES MELLITUS WITH DIABETIC POLYNEUROPATHY (HCC): ICD-10-CM

## 2017-10-03 DIAGNOSIS — Z23 NEED FOR VACCINATION: ICD-10-CM

## 2017-10-03 PROCEDURE — 99214 OFFICE O/P EST MOD 30 MIN: CPT | Mod: 25 | Performed by: FAMILY MEDICINE

## 2017-10-03 PROCEDURE — 90471 IMMUNIZATION ADMIN: CPT | Performed by: FAMILY MEDICINE

## 2017-10-03 PROCEDURE — 90686 IIV4 VACC NO PRSV 0.5 ML IM: CPT | Performed by: FAMILY MEDICINE

## 2017-10-03 RX ORDER — VARENICLINE TARTRATE 1 MG/1
1 TABLET, FILM COATED ORAL 2 TIMES DAILY
Qty: 60 TAB | Refills: 3 | Status: SHIPPED | OUTPATIENT
Start: 2017-10-03

## 2017-10-03 NOTE — ASSESSMENT & PLAN NOTE
With chantix down to 2 cigarettes a day.   He has been smoking 1-2 packs a day for 52 years  Will check screening CT.

## 2017-10-03 NOTE — ASSESSMENT & PLAN NOTE
Patient has DM1 which is significantly uncontrolled. He has an insulin pump and uses novolog. He has started working at [employer] and lost a lot of weight.   He has no hypoglycemic episodes A1c was above 11.   He was encouraged to get established with endocrinology, he was seen by them and told to check blood sugars three times a day and continue with titration of insulin.   Advised him to stay well hydrated.   He has neuropathy.   He will receive his flu shot today, he takes simvastatin.   He is interested in smoking cessation.

## 2017-10-04 ENCOUNTER — TELEPHONE (OUTPATIENT)
Dept: HEMATOLOGY ONCOLOGY | Facility: MEDICAL CENTER | Age: 57
End: 2017-10-04

## 2017-10-04 NOTE — TELEPHONE ENCOUNTER
Received referral to lung cancer screening program.  Chart review to assess for lung cancer screening program eligibility.   1. Age 55-77 yrs of age? Yes 57  y.o.  2. 30 pack year hx of smoking, or greater? Yes 1 ppdx50 yrs=  50 pkyr hx  3. Current smoker or if quit, has pt quit within last 15 yrs?Yes  Current smoker  4. Any signs or symptoms of lung cancer? None noted  5. Previous history of lung cancer? None noted  6. Chest CT within past 12 mos.? None noted  Patient does meet eligibility criteria. [facility] scheduling notified to schedule the shared decision making visit.

## 2017-10-05 NOTE — PROGRESS NOTES
"Subjective:   Michael Luu is a 57 y.o. male here today for evaluation and management of:     Type 1 diabetes mellitus with diabetic polyneuropathy (CMS-HCC)  Patient has DM1 which is significantly uncontrolled. He has an insulin pump and uses novolog. He has started working at [employer] and lost a lot of weight.   He has no hypoglycemic episodes A1c was above 11.   He was encouraged to get established with endocrinology, he was seen by them and told to check blood sugars three times a day and continue with titration of insulin.   Advised him to stay well hydrated.   He has neuropathy.   He will receive his flu shot today, he takes simvastatin.   He is interested in smoking cessation.     Tobacco use  With chantix down to 2 cigarettes a day.   He has been smoking 1-2 packs a day for 52 years  Will check screening CT.          Current medicines (including changes today)  Current Outpatient Prescriptions   Medication Sig Dispense Refill   • varenicline (CHANTIX CONTINUING MONTH PAK) 1 MG tablet Take 1 Tab by mouth 2 times a day. 60 Tab 3   • NOVOLOG 100 UNIT/ML Solution USE AS DIRECTED VIA INSULIN PUMP 40 mL 6   • simvastatin (ZOCOR) 40 MG Tab Take 1 Tab by mouth every evening. 30 Tab 11   • albuterol 108 (90 BASE) MCG/ACT Aero Soln inhalation aerosol Inhale 2 Puffs by mouth every 6 hours as needed for Shortness of Breath. 8.5 g 1     No current facility-administered medications for this visit.      He  has a past medical history of Diabetes (CMS-HCC).    ROS  No chest pain, no shortness of breath, no abdominal pain       Objective:     Blood pressure 120/68, pulse 79, temperature 36.9 °C (98.4 °F), resp. rate 18, height 1.88 m (6' 2\"), weight 74.7 kg (164 lb 9.6 oz), SpO2 97 %. Body mass index is 21.13 kg/m².   Physical Exam:  Constitutional: Alert, no distress.  Skin: Warm, dry, good turgor, no rashes in visible areas.  Eye: Equal, round and reactive, conjunctiva clear, lids normal.  ENMT: Lips without lesions, " good dentition, oropharynx clear.  Neck: Trachea midline, no masses, no thyromegaly. No cervical or supraclavicular lymphadenopathy  Respiratory: Unlabored respiratory effort, lungs clear to auscultation, no wheezes, no ronchi.  Cardiovascular: Normal S1, S2, no murmur, no edema.  Abdomen: Soft, non-tender, no masses, no hepatosplenomegaly.  Psych: Alert and oriented x3, normal affect and mood.        Assessment and Plan:   The following treatment plan was discussed    1. Type 1 diabetes mellitus with diabetic polyneuropathy (CMS-HCC)  Uncontrolled. He is now established with endocrinology. Continue with adjusting insulins he uses an insulin pump.   Will follow up with endocrinologist.   - REFERRAL TO OPHTHALMOLOGY  - HEMOGLOBIN A1C; Future    2. Tobacco use  Patient motivated to quit smoking, rx for chantix provided.   - REFERRAL TO LUNG CANCER SCREENING PROGRAM  - varenicline (CHANTIX CONTINUING MONTH ADIN) 1 MG tablet; Take 1 Tab by mouth 2 times a day.  Dispense: 60 Tab; Refill: 3    3. Need for vaccination  - INFLUENZA VACCINE QUAD INJ >3Y(PF)      Followup: Return in about 3 months (around 1/3/2018) for DM1, tobacco use, neuropathy.

## 2017-11-20 NOTE — TELEPHONE ENCOUNTER
Was the patient seen in the last year in this department? Yes     Does patient have an active prescription for medications requested? No     Received Request Via: Patient     Pt's wife called stating he will soon be out of insulin and needs a refill.

## 2018-05-08 ENCOUNTER — TELEPHONE (OUTPATIENT)
Dept: MEDICAL GROUP | Facility: PHYSICIAN GROUP | Age: 58
End: 2018-05-08